# Patient Record
Sex: FEMALE | Race: WHITE | NOT HISPANIC OR LATINO | Employment: FULL TIME | URBAN - METROPOLITAN AREA
[De-identification: names, ages, dates, MRNs, and addresses within clinical notes are randomized per-mention and may not be internally consistent; named-entity substitution may affect disease eponyms.]

---

## 2024-01-10 ENCOUNTER — TELEPHONE (OUTPATIENT)
Dept: PRIMARY CARE | Facility: CLINIC | Age: 69
End: 2024-01-10

## 2024-01-10 NOTE — TELEPHONE ENCOUNTER
She aspirated a pill last night and is still wheezing today and wants to be seen.  Please call her.

## 2024-06-25 DIAGNOSIS — I21.02 ST ELEVATION MYOCARDIAL INFARCTION INVOLVING LEFT ANTERIOR DESCENDING (LAD) CORONARY ARTERY (MULTI): ICD-10-CM

## 2024-06-25 DIAGNOSIS — I25.10 ATHEROSCLEROSIS OF NATIVE CORONARY ARTERY OF NATIVE HEART WITHOUT ANGINA PECTORIS: Primary | ICD-10-CM

## 2024-06-25 DIAGNOSIS — Z95.5 PRESENCE OF STENT IN CORONARY ARTERY: ICD-10-CM

## 2024-06-25 PROBLEM — I10 BENIGN ESSENTIAL HYPERTENSION: Status: ACTIVE | Noted: 2023-02-23

## 2024-06-25 RX ORDER — CLOPIDOGREL BISULFATE 75 MG/1
75 TABLET ORAL DAILY
COMMUNITY
End: 2024-06-25 | Stop reason: SDUPTHER

## 2024-06-25 RX ORDER — CLOPIDOGREL BISULFATE 75 MG/1
75 TABLET ORAL DAILY
Qty: 90 TABLET | Refills: 0 | Status: SHIPPED | OUTPATIENT
Start: 2024-06-25 | End: 2024-09-23

## 2024-06-25 NOTE — TELEPHONE ENCOUNTER
----- Message from Abena Ortiz RN sent at 6/24/2024  6:18 PM EDT -----  Regarding: Refill  [facility] in in [place] called requesting a refill of the patient's Plavix.

## 2024-07-03 PROBLEM — F32.5 MAJOR DEPRESSIVE DISORDER IN FULL REMISSION (CMS-HCC): Status: ACTIVE | Noted: 2023-02-23

## 2024-07-03 PROBLEM — G89.29 CHRONIC LEFT SHOULDER PAIN: Status: ACTIVE | Noted: 2023-02-24

## 2024-07-03 PROBLEM — R01.1 CARDIAC MURMUR: Status: ACTIVE | Noted: 2024-07-03

## 2024-07-03 PROBLEM — R53.83 FATIGUE: Status: ACTIVE | Noted: 2024-07-03

## 2024-07-03 PROBLEM — Z86.79 H/O UNSTABLE ANGINA: Status: ACTIVE | Noted: 2024-07-03

## 2024-07-03 PROBLEM — J30.2 CHRONIC SEASONAL ALLERGIC RHINITIS: Status: ACTIVE | Noted: 2023-02-23

## 2024-07-03 PROBLEM — G47.33 OSA (OBSTRUCTIVE SLEEP APNEA): Status: ACTIVE | Noted: 2023-02-23

## 2024-07-03 PROBLEM — N39.0 UTI (URINARY TRACT INFECTION): Status: ACTIVE | Noted: 2024-07-03

## 2024-07-03 PROBLEM — R73.9 HYPERGLYCEMIA: Status: ACTIVE | Noted: 2024-04-23

## 2024-07-03 PROBLEM — I21.3 STEMI (ST ELEVATION MYOCARDIAL INFARCTION) (MULTI): Status: ACTIVE | Noted: 2017-08-04

## 2024-07-03 PROBLEM — E55.9 VITAMIN D DEFICIENCY: Status: ACTIVE | Noted: 2024-07-03

## 2024-07-03 PROBLEM — R30.0 DYSURIA: Status: ACTIVE | Noted: 2024-07-03

## 2024-07-03 PROBLEM — B02.29 POSTHERPETIC NEURALGIA: Status: ACTIVE | Noted: 2023-02-26

## 2024-07-03 PROBLEM — I49.3 SYMPTOMATIC PVCS: Status: ACTIVE | Noted: 2024-04-13

## 2024-07-03 PROBLEM — R73.03 PREDIABETES: Status: ACTIVE | Noted: 2023-02-23

## 2024-07-03 PROBLEM — R31.9 HEMATURIA: Status: ACTIVE | Noted: 2024-07-03

## 2024-07-03 PROBLEM — J42 CHRONIC BRONCHITIS (MULTI): Status: ACTIVE | Noted: 2024-01-10

## 2024-07-03 PROBLEM — J01.90 ACUTE INFECTION OF SINUS: Status: ACTIVE | Noted: 2024-07-03

## 2024-07-03 PROBLEM — I25.2 HISTORY OF NON-ST ELEVATION MYOCARDIAL INFARCTION (NSTEMI): Status: ACTIVE | Noted: 2023-11-13

## 2024-07-03 PROBLEM — M25.512 CHRONIC LEFT SHOULDER PAIN: Status: ACTIVE | Noted: 2023-02-24

## 2024-07-03 PROBLEM — R10.9 FLANK PAIN: Status: ACTIVE | Noted: 2024-07-03

## 2024-07-03 PROBLEM — F32.A DEPRESSION: Status: ACTIVE | Noted: 2024-07-03

## 2024-07-03 PROBLEM — Z95.5 HISTORY OF CORONARY ARTERY STENT PLACEMENT: Status: ACTIVE | Noted: 2024-04-13

## 2024-07-03 PROBLEM — Z95.5 PRESENCE OF STENT IN CORONARY ARTERY: Status: RESOLVED | Noted: 2024-06-25 | Resolved: 2024-07-03

## 2024-07-03 RX ORDER — ACETAMINOPHEN 500 MG
2000 TABLET ORAL DAILY
COMMUNITY
End: 2024-07-09 | Stop reason: WASHOUT

## 2024-07-03 RX ORDER — GABAPENTIN 300 MG/1
1 CAPSULE ORAL 3 TIMES DAILY
COMMUNITY

## 2024-07-03 RX ORDER — LEVOCETIRIZINE DIHYDROCHLORIDE 5 MG/1
5 TABLET, FILM COATED ORAL EVERY EVENING
COMMUNITY

## 2024-07-03 RX ORDER — AMLODIPINE BESYLATE 5 MG/1
5 TABLET ORAL DAILY
COMMUNITY
Start: 2023-02-23 | End: 2024-07-09 | Stop reason: WASHOUT

## 2024-07-03 RX ORDER — EMPAGLIFLOZIN 10 MG/1
10 TABLET, FILM COATED ORAL DAILY
COMMUNITY
Start: 2023-02-23

## 2024-07-03 RX ORDER — METOPROLOL SUCCINATE 50 MG/1
50 TABLET, EXTENDED RELEASE ORAL DAILY
COMMUNITY

## 2024-07-03 RX ORDER — NAPROXEN SODIUM 220 MG/1
81 TABLET, FILM COATED ORAL DAILY
COMMUNITY
End: 2024-07-09 | Stop reason: WASHOUT

## 2024-07-03 RX ORDER — ALBUTEROL SULFATE 90 UG/1
2 AEROSOL, METERED RESPIRATORY (INHALATION) EVERY 4 HOURS PRN
COMMUNITY
Start: 2024-01-10 | End: 2024-07-09 | Stop reason: WASHOUT

## 2024-07-03 RX ORDER — LOSARTAN POTASSIUM 25 MG/1
25 TABLET ORAL DAILY
COMMUNITY
Start: 2024-04-23 | End: 2024-07-09 | Stop reason: WASHOUT

## 2024-07-03 RX ORDER — ROSUVASTATIN CALCIUM 40 MG/1
40 TABLET, COATED ORAL DAILY
COMMUNITY
Start: 2023-05-19

## 2024-07-03 RX ORDER — AZELASTINE 1 MG/ML
1 SPRAY, METERED NASAL 2 TIMES DAILY
COMMUNITY
Start: 2023-01-31

## 2024-07-03 RX ORDER — ATORVASTATIN CALCIUM 80 MG/1
80 TABLET, FILM COATED ORAL DAILY
COMMUNITY
Start: 2024-04-14

## 2024-07-03 RX ORDER — EZETIMIBE 10 MG/1
10 TABLET ORAL DAILY
COMMUNITY
Start: 2024-04-13

## 2024-07-03 RX ORDER — FLUOXETINE 10 MG/1
10 CAPSULE ORAL DAILY
COMMUNITY
End: 2024-07-09 | Stop reason: WASHOUT

## 2024-07-09 ENCOUNTER — APPOINTMENT (OUTPATIENT)
Dept: CARDIOLOGY | Facility: CLINIC | Age: 69
End: 2024-07-09

## 2024-07-09 VITALS
RESPIRATION RATE: 17 BRPM | HEART RATE: 62 BPM | OXYGEN SATURATION: 96 % | BODY MASS INDEX: 29.39 KG/M2 | HEIGHT: 58 IN | DIASTOLIC BLOOD PRESSURE: 70 MMHG | WEIGHT: 140 LBS | SYSTOLIC BLOOD PRESSURE: 118 MMHG

## 2024-07-09 DIAGNOSIS — I49.3 SYMPTOMATIC PVCS: Primary | ICD-10-CM

## 2024-07-09 DIAGNOSIS — Z95.5 PRESENCE OF STENT IN CORONARY ARTERY: ICD-10-CM

## 2024-07-09 DIAGNOSIS — E78.2 MIXED HYPERLIPIDEMIA: ICD-10-CM

## 2024-07-09 DIAGNOSIS — I10 BENIGN HYPERTENSION: ICD-10-CM

## 2024-07-09 DIAGNOSIS — Z95.5 HISTORY OF CORONARY ARTERY STENT PLACEMENT: ICD-10-CM

## 2024-07-09 DIAGNOSIS — I25.10 CORONARY ARTERY DISEASE INVOLVING NATIVE CORONARY ARTERY OF NATIVE HEART WITHOUT ANGINA PECTORIS: ICD-10-CM

## 2024-07-09 DIAGNOSIS — I21.02 ST ELEVATION MYOCARDIAL INFARCTION INVOLVING LEFT ANTERIOR DESCENDING (LAD) CORONARY ARTERY (MULTI): ICD-10-CM

## 2024-07-09 DIAGNOSIS — I25.10 ATHEROSCLEROSIS OF NATIVE CORONARY ARTERY OF NATIVE HEART WITHOUT ANGINA PECTORIS: ICD-10-CM

## 2024-07-09 PROBLEM — Z86.79 H/O UNSTABLE ANGINA: Status: RESOLVED | Noted: 2024-07-03 | Resolved: 2024-07-09

## 2024-07-09 PROBLEM — I25.2 HISTORY OF NON-ST ELEVATION MYOCARDIAL INFARCTION (NSTEMI): Status: RESOLVED | Noted: 2023-11-13 | Resolved: 2024-07-09

## 2024-07-09 PROBLEM — I21.3 STEMI (ST ELEVATION MYOCARDIAL INFARCTION) (MULTI): Status: RESOLVED | Noted: 2017-08-04 | Resolved: 2024-07-09

## 2024-07-09 PROCEDURE — 1160F RVW MEDS BY RX/DR IN RCRD: CPT | Performed by: INTERNAL MEDICINE

## 2024-07-09 PROCEDURE — 93000 ELECTROCARDIOGRAM COMPLETE: CPT | Performed by: INTERNAL MEDICINE

## 2024-07-09 PROCEDURE — 1159F MED LIST DOCD IN RCRD: CPT | Performed by: INTERNAL MEDICINE

## 2024-07-09 PROCEDURE — 3078F DIAST BP <80 MM HG: CPT | Performed by: INTERNAL MEDICINE

## 2024-07-09 PROCEDURE — 1036F TOBACCO NON-USER: CPT | Performed by: INTERNAL MEDICINE

## 2024-07-09 PROCEDURE — 3074F SYST BP LT 130 MM HG: CPT | Performed by: INTERNAL MEDICINE

## 2024-07-09 PROCEDURE — 99214 OFFICE O/P EST MOD 30 MIN: CPT | Performed by: INTERNAL MEDICINE

## 2024-07-09 RX ORDER — LOSARTAN POTASSIUM 25 MG/1
25 TABLET ORAL DAILY
Qty: 90 TABLET | Refills: 3 | Status: SHIPPED | OUTPATIENT
Start: 2024-07-09 | End: 2025-07-09

## 2024-07-09 RX ORDER — CLOPIDOGREL BISULFATE 75 MG/1
75 TABLET ORAL DAILY
Qty: 90 TABLET | Refills: 3 | Status: SHIPPED | OUTPATIENT
Start: 2024-07-09 | End: 2025-07-09

## 2024-07-09 NOTE — PROGRESS NOTES
Chief Complaint:   Annual Exam     History Of Present Illness:    Saul Spivey is a 69 y.o. female presenting for annual follow-up.  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 She had inferior wall myocardial infarction in August 2017 while visiting [place]. The RCA was stented. She was left with a severe proximal LAD lesion. She returned to [place] and had a significantly abnormal stress test and underwent stenting of the LAD. The circumflex artery had mild disease. She has continued medical therapy. She tells me that she will occasionally have PVCs in a pattern of bigeminy which otherwise are asymptomatic.  Recently had a severe episode associated with significantly decreased potassium level of 2.7.  Symptoms improved after correction of the potassium.  Her amlodipine was switched to losartan at that point and potassium supplements added.  She also had a stress test that was unremarkable.    She has an apple watch and follows her heart rhythm on it. She is without chest pain, dyspnea, edema, lightheadedness, syncope, orthopnea, PND, or claudication.      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 We performed another cardiac catheterization but coronaries appeared unremarkable without any significant stenosis. Her cardiac enzymes remained negative. She has been discharged home without any recurrence of symptoms.     EKG is sinus rhythm. Inferior infarct. Late transition, consider anterior infarct. EKG is similar to the previous EKG.      She states her blood pressure is well controlled.  Echocardiogram in September 2018 with mild LV dysfunction(LVEF 45%, inferior WMA). Mild mitral regurgitation.     Stress testing in 2024 and 2018 was without ischemia.     Carotid duplex in October 2017 with mild disease.      .     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    Past Surgical History:  She has a past surgical history that includes Cervical laminectomy (2017); Carpal tunnel release (2017); Shoulder surgery (2017);  section, classic (2017); Bladder surgery (2017); and Coronary angioplasty (08/15/2017).      Social History:  She reports that she has never smoked. She has never used smokeless tobacco. She reports that she does not drink alcohol and does not use drugs.    Family History:  No family history on file.     Allergies:  Ticagrelor and Sulfa (sulfonamide antibiotics)    Outpatient Medications:  Current Outpatient Medications   Medication Instructions    albuterol 90 mcg/actuation inhaler 2 puffs, inhalation, Every 4 hours PRN    amLODIPine (NORVASC) 5 mg, oral, Daily    aspirin 81 mg, oral, Daily    atorvastatin (LIPITOR) 80 mg, oral, Daily    azelastine (Astelin) 137 mcg (0.1 %) nasal spray 1 spray, Each Nostril, 2 times daily    cholecalciferol (VITAMIN D-3) 2,000 Units, oral, Daily    clopidogrel (PLAVIX) 75 mg, oral, Daily    ezetimibe (ZETIA) 10 mg, oral, Daily    FLUoxetine (PROZAC) 10 mg, oral, Daily    gabapentin (Neurontin) 300 mg " capsule 1 capsule, oral, 3 times daily    Jardiance 10 mg, oral, Daily    levocetirizine (XYZAL) 5 mg, oral, Every evening    losartan (COZAAR) 25 mg, oral, Daily    metoprolol succinate XL (TOPROL-XL) 50 mg, oral, Daily    rosuvastatin (CRESTOR) 40 mg, oral, Daily       Physical Exam:  Physical Exam  Vitals reviewed.   Constitutional:       Appearance: Normal appearance.   Neck:      Vascular: No carotid bruit or JVD.   Cardiovascular:      Rate and Rhythm: Normal rate and regular rhythm.      Pulses: Normal pulses.      Heart sounds: Normal heart sounds, S1 normal and S2 normal.   Pulmonary:      Effort: Pulmonary effort is normal. No respiratory distress.      Breath sounds: No wheezing or rales.   Abdominal:      General: Abdomen is flat.      Palpations: Abdomen is soft.   Musculoskeletal:      Right lower leg: No edema.      Left lower leg: No edema.   Skin:     General: Skin is warm.   Neurological:      Mental Status: She is alert and oriented to person, place, and time. Mental status is at baseline.   Psychiatric:         Mood and Affect: Mood normal.         Behavior: Behavior normal.           Last Labs:  CBC -  Lab Results   Component Value Date    WBC 12.4 (H) 10/04/2021    HGB 13.5 10/04/2021    HCT 40.2 10/04/2021    MCV 88 10/04/2021     10/04/2021       CMP -  Lab Results   Component Value Date    CALCIUM 9.9 01/03/2022    PROT 6.8 10/04/2021    ALBUMIN 4.3 10/04/2021    AST 20 10/04/2021    ALT 22 10/04/2021    ALKPHOS 101 10/04/2021    BILITOT 0.5 10/04/2021       LIPID PANEL -   Lab Results   Component Value Date    CHOL 106 08/24/2022    TRIG 126 08/24/2022    HDL 43.6 08/24/2022    CHHDL 2.4 08/24/2022    LDLF 37 08/24/2022    VLDL 25 08/24/2022       RENAL FUNCTION PANEL -   Lab Results   Component Value Date    GLUCOSE 90 01/03/2022     01/03/2022    K 4.3 01/03/2022     01/03/2022    CO2 26 01/03/2022    ANIONGAP 12 01/03/2022    BUN 15 01/03/2022    CREATININE 0.88  "01/03/2022    CALCIUM 9.9 01/03/2022    ALBUMIN 4.3 10/04/2021        Lab Results   Component Value Date     (H) 10/04/2021    HGBA1C 6.0 (H) 07/05/2024       Last Cardiology Tests:  ECG:  No results found for this or any previous visit from the past 1095 days.      Echo:  No results found for this or any previous visit from the past 1095 days.      Ejection Fractions:  No results found for: \"EF\"    Cath:  No results found for this or any previous visit from the past 1095 days.      Stress Test:  No results found for this or any previous visit from the past 1095 days.      Cardiac Imaging:  No results found for this or any previous visit from the past 1095 days.          Assessment/Plan   1-The patient has coronary artery disease. She had inferior wall myocardial infarction. She has had stenting of the RCA and proximal LAD. She is asymptomatic. She will continue the antihyperlipidemic regimen as well as her beta blocker and ARB.  She presented with unstable angina type of presentation with very abnormal appearing EKG in 2021.. Coronaries appeared normal on cath. Differential diagnosis plaque rupture that resolved or due to vasospasm. She has as needed nitroglycerin. PCSK9 inhibitors were not approved by her insurance. Started amlodipine and Zetia. He seems to be doing well now.  Her amlodipine was switched to losartan due to hypokalemia.    2-Patient has hyperlipidemia. She continues to have acute coronary set type of symptoms, see above. This is favorable at present.  Continue current management.     3-Patient has hypertension which is well-controlled on the current regimen. Blood pressure goal is less than 140/90 and preferably towards 120/70. Patient is at goal. Patient will continue the regimen.     4-symptomatic PVCs-recently admitted in [facility] with multifocal PVCs in setting of severe hypokalemia the cause of which remains unclear at this time.  No symptoms since potassium was corrected.  A " follow-up Holter monitor was done that showed less than 1% PVCs.  She also had a stress test that was normal.  Her amlodipine was switched to losartan and her potassium seems to have now corrected.         Ladarius Salinas MD

## 2024-10-09 DIAGNOSIS — Z95.5 HISTORY OF CORONARY ARTERY STENT PLACEMENT: ICD-10-CM

## 2024-10-09 DIAGNOSIS — E78.2 MIXED HYPERLIPIDEMIA: Primary | ICD-10-CM

## 2024-10-09 DIAGNOSIS — I25.10 ATHEROSCLEROSIS OF NATIVE CORONARY ARTERY OF NATIVE HEART WITHOUT ANGINA PECTORIS: ICD-10-CM

## 2024-10-09 RX ORDER — EZETIMIBE 10 MG/1
10 TABLET ORAL DAILY
Qty: 90 TABLET | Refills: 2 | Status: SHIPPED | OUTPATIENT
Start: 2024-10-09 | End: 2025-07-06

## 2024-10-09 NOTE — TELEPHONE ENCOUNTER
----- Message from Nurse Abena YI sent at 10/9/2024  1:17 PM EDT -----  Regarding: Refill  Patient is requesting a refill of Zetia to be sent to [facility] in [place].

## 2025-03-03 ENCOUNTER — TELEPHONE (OUTPATIENT)
Dept: CARDIOLOGY | Facility: HOSPITAL | Age: 70
End: 2025-03-03
Payer: COMMERCIAL

## 2025-03-03 NOTE — TELEPHONE ENCOUNTER
Patient called office stating she was told she needs to have our office submit a prior auth on her Jardiance 10 mg [[id number]]. Patient is aware we are not the ordering provider and states she was told she has to take this issue up with her cardiologist. Patient provided a phone # [phone] a fax # [phone] and a website address [email or web address]

## 2025-03-05 NOTE — TELEPHONE ENCOUNTER
Called her back to discuss.  Her prescribing provider should handle the prior authorization.  She is working on an appeal.

## 2025-03-18 ENCOUNTER — TRANSCRIBE ORDERS (OUTPATIENT)
Dept: ADMINISTRATIVE | Age: 70
End: 2025-03-18

## 2025-03-18 DIAGNOSIS — Z12.31 ENCOUNTER FOR SCREENING MAMMOGRAM FOR MALIGNANT NEOPLASM OF BREAST: Primary | ICD-10-CM

## 2025-06-18 NOTE — PROGRESS NOTES
"Primary Cardiologist: Dr. Salinas    Chief Complaint:   No chief complaint on file.     History Of Present Illness:    Saul Spivey is a 70 y.o. female with past medical history of CAD s/p inferior wall MI s/p PCI to RCA (2017), s/p PCI to LAD ( 2017 ), Bigeminy PVCs in setting of hypokalemia, hypertension, and dyslipidemia who presents for preoperative cardiac risk assessment for hand surgery.      Today, Saul Spivey is feeling well overall, no cardiac concerns, no recent hospitalizations. Denies chest pain or pressure, no shortness of breath at rest and has stable, chronic shortness of breath with heavy exertion only, no lower ext edema.     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    Past Surgical History:  She has a past surgical history that includes Cervical laminectomy (2017); Carpal tunnel release (2017); Shoulder surgery (2017);  section, classic (2017); Bladder surgery " (08/08/2017); and Coronary angioplasty (08/15/2017).      Social History:  She reports that she has never smoked. She has never used smokeless tobacco. She reports that she does not drink alcohol and does not use drugs.    Family History:  Family History[1]     Allergies:  Ticagrelor and Sulfa (sulfonamide antibiotics)    Outpatient Medications:  Current Outpatient Medications   Medication Instructions    atorvastatin (LIPITOR) 80 mg, Daily    azelastine (Astelin) 137 mcg (0.1 %) nasal spray 1 spray, 2 times daily    clopidogrel (PLAVIX) 75 mg, oral, Daily    ezetimibe (ZETIA) 10 mg, oral, Daily    gabapentin (Neurontin) 300 mg capsule 1 capsule, 3 times daily    Jardiance 10 mg, Daily    levocetirizine (XYZAL) 5 mg, Every evening    losartan (COZAAR) 25 mg, oral, Daily    metoprolol succinate XL (TOPROL-XL) 50 mg, Daily    rosuvastatin (CRESTOR) 40 mg, Daily         Review of Systems   Constitutional: Negative for malaise/fatigue.   HENT: Negative.     Eyes: Negative.    Cardiovascular:  Positive for dyspnea on exertion (chronic, stable with heavy exertion). Negative for chest pain, leg swelling, near-syncope, palpitations and syncope.   Respiratory:  Negative for shortness of breath.    Endocrine: Negative.    Hematologic/Lymphatic: Negative.    Skin: Negative.    Musculoskeletal: Negative.    Gastrointestinal: Negative.  Negative for hematemesis and melena.   Genitourinary: Negative.  Negative for hematuria.   Neurological:  Negative for dizziness and light-headedness.        Physical Exam  Vitals reviewed.   Constitutional:       Appearance: Normal appearance.   HENT:      Head: Normocephalic.      Mouth/Throat:      Mouth: Mucous membranes are moist.   Cardiovascular:      Rate and Rhythm: Normal rate and regular rhythm.      Pulses: Normal pulses.      Heart sounds: Normal heart sounds. No murmur heard.     No friction rub. No gallop.   Pulmonary:      Effort: Pulmonary effort is normal.      Breath sounds:  "Normal breath sounds. No wheezing, rhonchi or rales.   Abdominal:      General: Bowel sounds are normal.      Palpations: Abdomen is soft.   Musculoskeletal:         General: Normal range of motion.      Cervical back: Normal range of motion.      Right lower leg: No edema.      Left lower leg: No edema.   Skin:     General: Skin is warm and dry.   Neurological:      General: No focal deficit present.      Mental Status: She is alert and oriented to person, place, and time.   Psychiatric:         Mood and Affect: Mood normal.         Behavior: Behavior normal.         Thought Content: Thought content normal.         Judgment: Judgment normal.           Last Labs:  CBC -  Lab Results   Component Value Date    WBC 12.4 (H) 10/04/2021    HGB 13.5 10/04/2021    HCT 40.2 10/04/2021    MCV 88 10/04/2021     10/04/2021       CMP -  Lab Results   Component Value Date    CALCIUM 9.9 01/03/2022    PROT 6.8 10/04/2021    ALBUMIN 4.3 10/04/2021    AST 20 10/04/2021    ALT 22 10/04/2021    ALKPHOS 101 10/04/2021    BILITOT 0.5 10/04/2021       LIPID PANEL -   Lab Results   Component Value Date    CHOL 106 08/24/2022    TRIG 126 08/24/2022    HDL 43.6 08/24/2022    CHHDL 2.4 08/24/2022    LDLF 37 08/24/2022    VLDL 25 08/24/2022       RENAL FUNCTION PANEL -   Lab Results   Component Value Date    GLUCOSE 90 01/03/2022     01/03/2022    K 4.3 01/03/2022     01/03/2022    CO2 26 01/03/2022    ANIONGAP 12 01/03/2022    BUN 15 01/03/2022    CREATININE 0.88 01/03/2022    CALCIUM 9.9 01/03/2022    ALBUMIN 4.3 10/04/2021        Lab Results   Component Value Date     (H) 10/04/2021    HGBA1C 5.9 (H) 06/02/2025       Last Cardiology Tests:  ECG:  SR, septal infarct, t wave inversion III, avf which appear stable when compared to prior ECG. Rate of 65 bpm.      Echo:  TTE 9/2018:     Ejection Fractions:  No results found for: \"EF\"    Cath:  [facility] [place] [facility] 10/21:   1. MIld nonobstructive CAD.   2. No significant LV-AO peak to " peak pullback gradient.   3. Left Ventricular end-diastolic pressure = 4.     Stress Test:      Cardiac Imaging:  No results found for this or any previous visit from the past 1095 days.        Lab review: I have personally reviewed the laboratory result(s)     Assessment/Plan     Saul Spivey is a 70 y.o. 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     Preoperative cardiovascular risk stratification  The patient states that they are being evaluated for cardiac risk prior to hand surgery.   The patient has history of: left ventricular dysfunction and ischemic cardiovascular disease  Patient has history of LV dysfunction with EF 45%, now recovered EF to 60%. Patient is compensated on exam.  No known history of Ischemic cerebrovascular disease, Insulin-dependent diabetes mellitus , or Significant renal dysfunction with creatinine >2  The patient is not scheduled for a high risk surgery (intrathoracic; suprainguinal vascular or intraperitoneal)  RCRI score is 2  Patient has a history of coronary artery disease and PCI.  Recommend taking aspirin 81 mg daily up to and including day of procedure without interruption while holding clopidogrel. When patient resumes clopidogrel, recommend discontinuing aspirin.   Recommend continuing beta blocker without interruption through perioperative period.  Overall, the patient is at low-intermediate risk for perioperative MACE.  Patient expressed understanding of the risk for perioperative cardiovascular complications.       Coronary artery disease  s/p inferior wall MI s/p PCI to RCA (2017), s/p PCI to LAD( )  TTE 2018 with LVEF 45%  Today, denies chest pain or pressure, shortness of breath with heavy exertion only.   LDL 13 (7/2024), at goal  Blood pressure is well  controlled today  Continue on Plavix 75 mg daily, metoprolol succinate 50 mg daily, rosuvastatin 40 mg daily  CBC ordered today to monitor H/H, platelet count  ALT/AST ordered today    HFmrEF  TTE 2018: LVEF 45%  Echo stress 2024: 60% LVEF  Today, has chronic, stable shortness of breath with heavy exertion only. No lower extremity edema, appears compensated on exam   Continue on Jardiance 10 mg daily, losartan 25 mg daily, metoprolol succinate 50 mg daily    PVCs   In setting of hypokalemia  ECG today did not demonstrate PVC  Last K normal (6/25)  Continue on metoprolol succinate 50 mg daily.       Cynthia Escobar, APRN-CNP          [1] No family history on file.

## 2025-06-19 ENCOUNTER — OFFICE VISIT (OUTPATIENT)
Dept: CARDIOLOGY | Facility: HOSPITAL | Age: 70
End: 2025-06-19
Payer: COMMERCIAL

## 2025-06-19 VITALS
BODY MASS INDEX: 29.14 KG/M2 | HEIGHT: 58 IN | SYSTOLIC BLOOD PRESSURE: 116 MMHG | DIASTOLIC BLOOD PRESSURE: 70 MMHG | WEIGHT: 138.8 LBS | HEART RATE: 65 BPM

## 2025-06-19 DIAGNOSIS — Z95.5 HISTORY OF CORONARY ARTERY STENT PLACEMENT: ICD-10-CM

## 2025-06-19 DIAGNOSIS — I25.10 CORONARY ARTERY DISEASE INVOLVING NATIVE CORONARY ARTERY OF NATIVE HEART WITHOUT ANGINA PECTORIS: ICD-10-CM

## 2025-06-19 DIAGNOSIS — Z01.818 PRE-OPERATIVE CLEARANCE: Primary | ICD-10-CM

## 2025-06-19 DIAGNOSIS — I49.3 SYMPTOMATIC PVCS: ICD-10-CM

## 2025-06-19 DIAGNOSIS — I10 BENIGN HYPERTENSION: ICD-10-CM

## 2025-06-19 DIAGNOSIS — I25.10 ATHEROSCLEROSIS OF NATIVE CORONARY ARTERY OF NATIVE HEART WITHOUT ANGINA PECTORIS: ICD-10-CM

## 2025-06-19 DIAGNOSIS — E78.2 MIXED HYPERLIPIDEMIA: ICD-10-CM

## 2025-06-19 LAB
ATRIAL RATE: 65 BPM
P AXIS: 28 DEGREES
P OFFSET: 201 MS
P ONSET: 153 MS
PR INTERVAL: 150 MS
Q ONSET: 228 MS
QRS COUNT: 11 BEATS
QRS DURATION: 76 MS
QT INTERVAL: 408 MS
QTC CALCULATION(BAZETT): 424 MS
QTC FREDERICIA: 418 MS
R AXIS: 49 DEGREES
T AXIS: -14 DEGREES
T OFFSET: 432 MS
VENTRICULAR RATE: 65 BPM

## 2025-06-19 PROCEDURE — 1036F TOBACCO NON-USER: CPT | Performed by: CLINICAL NURSE SPECIALIST

## 2025-06-19 PROCEDURE — 3074F SYST BP LT 130 MM HG: CPT | Performed by: CLINICAL NURSE SPECIALIST

## 2025-06-19 PROCEDURE — 3008F BODY MASS INDEX DOCD: CPT | Performed by: CLINICAL NURSE SPECIALIST

## 2025-06-19 PROCEDURE — 3078F DIAST BP <80 MM HG: CPT | Performed by: CLINICAL NURSE SPECIALIST

## 2025-06-19 PROCEDURE — 99214 OFFICE O/P EST MOD 30 MIN: CPT | Performed by: CLINICAL NURSE SPECIALIST

## 2025-06-19 PROCEDURE — 99202 OFFICE O/P NEW SF 15 MIN: CPT | Performed by: CLINICAL NURSE SPECIALIST

## 2025-06-19 PROCEDURE — 1160F RVW MEDS BY RX/DR IN RCRD: CPT | Performed by: CLINICAL NURSE SPECIALIST

## 2025-06-19 PROCEDURE — 1159F MED LIST DOCD IN RCRD: CPT | Performed by: CLINICAL NURSE SPECIALIST

## 2025-06-19 PROCEDURE — 93005 ELECTROCARDIOGRAM TRACING: CPT | Performed by: CLINICAL NURSE SPECIALIST

## 2025-06-19 RX ORDER — METOPROLOL SUCCINATE 50 MG/1
50 TABLET, EXTENDED RELEASE ORAL DAILY
Qty: 90 TABLET | Refills: 3 | Status: SHIPPED | OUTPATIENT
Start: 2025-06-19 | End: 2026-06-19

## 2025-06-19 RX ORDER — ROSUVASTATIN CALCIUM 40 MG/1
40 TABLET, COATED ORAL DAILY
Qty: 90 TABLET | Refills: 3 | Status: SHIPPED | OUTPATIENT
Start: 2025-06-19 | End: 2026-06-19

## 2025-06-19 RX ORDER — EZETIMIBE 10 MG/1
10 TABLET ORAL DAILY
Qty: 90 TABLET | Refills: 2 | Status: SHIPPED | OUTPATIENT
Start: 2025-06-19 | End: 2026-03-16

## 2025-06-19 RX ORDER — LOSARTAN POTASSIUM 25 MG/1
25 TABLET ORAL DAILY
Qty: 90 TABLET | Refills: 3 | Status: SHIPPED | OUTPATIENT
Start: 2025-06-19 | End: 2026-06-19

## 2025-06-19 ASSESSMENT — ENCOUNTER SYMPTOMS
MUSCULOSKELETAL NEGATIVE: 1
PALPITATIONS: 0
LIGHT-HEADEDNESS: 0
ENDOCRINE NEGATIVE: 1
SYNCOPE: 0
NEAR-SYNCOPE: 0
DIZZINESS: 0
HEMATEMESIS: 0
SHORTNESS OF BREATH: 0
GASTROINTESTINAL NEGATIVE: 1
HEMATOLOGIC/LYMPHATIC NEGATIVE: 1
DYSPNEA ON EXERTION: 1
EYES NEGATIVE: 1
HEMATURIA: 0

## 2025-06-19 NOTE — PATIENT INSTRUCTIONS
Labs have been ordered for you to complete when able.   A copy of our office note will be sent to your surgeon for review.   Call our office with any new cardiac concerns  Continue current medications  Continue heart-healthy diet. A diet low in sodium, low in cholesterol, limiting red meats and eating whole foods.   Follow up with Dr. Salinas as scheduled.

## 2025-06-20 PROBLEM — E78.5 DYSLIPIDEMIA: Status: ACTIVE | Noted: 2023-02-26

## 2025-06-20 PROBLEM — M79.671 PAIN IN RIGHT FOOT: Status: ACTIVE | Noted: 2025-06-20

## 2025-06-20 PROBLEM — G47.30 SLEEP APNEA: Status: ACTIVE | Noted: 2023-02-23

## 2025-06-20 PROBLEM — L85.1 ACQUIRED KERATODERMA: Status: ACTIVE | Noted: 2025-06-20

## 2025-07-16 ENCOUNTER — APPOINTMENT (OUTPATIENT)
Dept: CARDIOLOGY | Facility: CLINIC | Age: 70
End: 2025-07-16
Payer: COMMERCIAL

## 2025-09-16 ENCOUNTER — APPOINTMENT (OUTPATIENT)
Dept: CARDIOLOGY | Facility: HOSPITAL | Age: 70
End: 2025-09-16
Payer: COMMERCIAL